# Patient Record
Sex: FEMALE | Race: BLACK OR AFRICAN AMERICAN | NOT HISPANIC OR LATINO | Employment: FULL TIME | URBAN - METROPOLITAN AREA
[De-identification: names, ages, dates, MRNs, and addresses within clinical notes are randomized per-mention and may not be internally consistent; named-entity substitution may affect disease eponyms.]

---

## 2022-05-01 ENCOUNTER — OFFICE VISIT (OUTPATIENT)
Dept: URGENT CARE | Facility: URGENT CARE | Age: 22
End: 2022-05-01
Payer: COMMERCIAL

## 2022-05-01 ENCOUNTER — ANCILLARY PROCEDURE (OUTPATIENT)
Dept: GENERAL RADIOLOGY | Facility: CLINIC | Age: 22
End: 2022-05-01
Attending: PHYSICIAN ASSISTANT
Payer: COMMERCIAL

## 2022-05-01 VITALS
DIASTOLIC BLOOD PRESSURE: 68 MMHG | HEIGHT: 64 IN | OXYGEN SATURATION: 97 % | WEIGHT: 138 LBS | SYSTOLIC BLOOD PRESSURE: 116 MMHG | RESPIRATION RATE: 16 BRPM | TEMPERATURE: 98.6 F | BODY MASS INDEX: 23.56 KG/M2 | HEART RATE: 86 BPM

## 2022-05-01 DIAGNOSIS — J45.909 MODERATE ASTHMA WITHOUT COMPLICATION, UNSPECIFIED WHETHER PERSISTENT: ICD-10-CM

## 2022-05-01 DIAGNOSIS — R06.02 SHORTNESS OF BREATH: Primary | ICD-10-CM

## 2022-05-01 DIAGNOSIS — R06.02 SHORTNESS OF BREATH: ICD-10-CM

## 2022-05-01 LAB
BASOPHILS # BLD AUTO: 0.1 10E3/UL (ref 0–0.2)
BASOPHILS NFR BLD AUTO: 0 %
EOSINOPHIL # BLD AUTO: 0.1 10E3/UL (ref 0–0.7)
EOSINOPHIL NFR BLD AUTO: 0 %
ERYTHROCYTE [DISTWIDTH] IN BLOOD BY AUTOMATED COUNT: 11.7 % (ref 10–15)
HCT VFR BLD AUTO: 39.1 % (ref 35–47)
HGB BLD-MCNC: 12.8 G/DL (ref 11.7–15.7)
IMM GRANULOCYTES # BLD: 0 10E3/UL
IMM GRANULOCYTES NFR BLD: 0 %
LYMPHOCYTES # BLD AUTO: 1 10E3/UL (ref 0.8–5.3)
LYMPHOCYTES NFR BLD AUTO: 8 %
MCH RBC QN AUTO: 32.2 PG (ref 26.5–33)
MCHC RBC AUTO-ENTMCNC: 32.7 G/DL (ref 31.5–36.5)
MCV RBC AUTO: 98 FL (ref 78–100)
MONOCYTES # BLD AUTO: 0.7 10E3/UL (ref 0–1.3)
MONOCYTES NFR BLD AUTO: 5 %
NEUTROPHILS # BLD AUTO: 11.3 10E3/UL (ref 1.6–8.3)
NEUTROPHILS NFR BLD AUTO: 86 %
PLATELET # BLD AUTO: 198 10E3/UL (ref 150–450)
RBC # BLD AUTO: 3.98 10E6/UL (ref 3.8–5.2)
WBC # BLD AUTO: 13.1 10E3/UL (ref 4–11)

## 2022-05-01 PROCEDURE — 85025 COMPLETE CBC W/AUTO DIFF WBC: CPT | Performed by: PHYSICIAN ASSISTANT

## 2022-05-01 PROCEDURE — U0005 INFEC AGEN DETEC AMPLI PROBE: HCPCS | Performed by: PHYSICIAN ASSISTANT

## 2022-05-01 PROCEDURE — 99204 OFFICE O/P NEW MOD 45 MIN: CPT | Performed by: PHYSICIAN ASSISTANT

## 2022-05-01 PROCEDURE — U0003 INFECTIOUS AGENT DETECTION BY NUCLEIC ACID (DNA OR RNA); SEVERE ACUTE RESPIRATORY SYNDROME CORONAVIRUS 2 (SARS-COV-2) (CORONAVIRUS DISEASE [COVID-19]), AMPLIFIED PROBE TECHNIQUE, MAKING USE OF HIGH THROUGHPUT TECHNOLOGIES AS DESCRIBED BY CMS-2020-01-R: HCPCS | Performed by: PHYSICIAN ASSISTANT

## 2022-05-01 PROCEDURE — 71046 X-RAY EXAM CHEST 2 VIEWS: CPT | Mod: TC | Performed by: RADIOLOGY

## 2022-05-01 PROCEDURE — 36415 COLL VENOUS BLD VENIPUNCTURE: CPT | Performed by: PHYSICIAN ASSISTANT

## 2022-05-01 RX ORDER — ALBUTEROL SULFATE 90 UG/1
2 AEROSOL, METERED RESPIRATORY (INHALATION) ONCE
Status: COMPLETED | OUTPATIENT
Start: 2022-05-01 | End: 2022-05-01

## 2022-05-01 RX ORDER — ALBUTEROL SULFATE 0.83 MG/ML
2.5 SOLUTION RESPIRATORY (INHALATION) EVERY 6 HOURS PRN
Qty: 90 ML | Refills: 0 | Status: SHIPPED | OUTPATIENT
Start: 2022-05-01

## 2022-05-01 RX ADMIN — ALBUTEROL SULFATE 2 PUFF: 90 INHALANT RESPIRATORY (INHALATION) at 19:11

## 2022-05-01 NOTE — PROGRESS NOTES
Assessment & Plan     1. Shortness of breath  - albuterol (PROVENTIL HFA/VENTOLIN HFA) inhaler  - XR Chest 2 Views; Future  - Nebulizer and Supplies Order for DME - ONLY FOR DME  - CBC with platelets and differential; Future  - Symptomatic; Unknown COVID-19 Virus (Coronavirus) by PCR Nose; Future  - CBC with platelets and differential  - Symptomatic; Unknown COVID-19 Virus (Coronavirus) by PCR Nose    2. Moderate asthma without complication, unspecified whether persistent  - albuterol (PROVENTIL) (2.5 MG/3ML) 0.083% neb solution; Take 1 vial (2.5 mg) by nebulization every 6 hours as needed for shortness of breath / dyspnea or wheezing  Dispense: 90 mL; Refill: 0    21-year-old female presents the clinic for evaluation of shortness of breath.  Symptoms started earlier this morning and additionally has had cough, body aches and fatigue.  She took an at home COVID test which was negative.  On exam, she appears well.  Vital signs are stable.  She is not hypoxic or tachypneic.  Lungs are clear to auscultation bilaterally, she does not have wheezing, rales or rhonchi.  Throat is clear without evidence of PTA or RPA.  TMs are clear bilaterally.  Chest x-ray is negative for infiltrate.  Due to her fatigue a CBC was obtained, she does have slightly elevated white blood cell count which I suspect is related to her viral illness. Hgb is stable.  Very low suspicion for PE, she is PERC negative and no further testing was done. she was given an albuterol inhaler, which helped with her shortness of breath.  She was also given refill for her nebulizer.  COVID test is pending.  Encourage fluids, rest and humidified air at night.    Return in about 3 days (around 5/4/2022), or if symptoms worsen or fail to improve.    Diagnosis and treatment plan was reviewed with patient and/or family.   We went over any labs or imaging. Discussed worsening symptoms or little to no relief despite treatment plan to follow-up with PCP or return to  "clinic.  Patient verbalizes understanding. All questions were addressed and answered.     Jia Aguirre PA-C  [facility] [place]    CHIEF COMPLAINT:   Chief Complaint   Patient presents with     Urgent Care     Needs work note because she is having shortness of breath and had to leave work.      Breathing Problem     Shortness of breath since this morning, fatigue, coughing and body aches. At home covid neg.      Subjective     Glenys is a 21 year old female who presents to clinic today for evaluation.  Starting this morning patient had cough, chest congestion, body aches and shortness of breath.  Additionally had fatigue.  Needed to leave work early because it was hard to do her work.  She took an at home COVID test which was negative.  She has a history of asthma, but has not needed an inhaler for the past 10 years.  She has not had fever or chills.  No chest pain.  Denies leg pain or swelling.  She has not had recent travel or surgery.      Past Medical History:   Diagnosis Date     Asthma      No past surgical history on file.  Social History     Tobacco Use     Smoking status: Never Smoker     Smokeless tobacco: Never Used   Substance Use Topics     Alcohol use: No     Current Outpatient Medications   Medication     albuterol (PROVENTIL) (2.5 MG/3ML) 0.083% neb solution     fluticasone-salmeterol (ADVAIR DISKUS) 250-50 MCG/DOSE diskus inhaler     No current facility-administered medications for this visit.     Allergies   Allergen Reactions     Amoxicillin Hives and Nausea and Vomiting       10 point ROS of systems were all negative except for pertinent positives noted in my HPI.      Exam:   /68   Pulse 86   Temp 98.6  F (37  C) (Temporal)   Resp 16   Ht 1.626 m (5' 4\")   Wt 62.6 kg (138 lb)   LMP 04/24/2022   SpO2 97%   Breastfeeding No   BMI 23.69 kg/m    Constitutional: healthy, alert and no distress  Head: Normocephalic, atraumatic.  Eyes: conjunctiva clear, " no drainage  ENT: TMs clear and shiny hyun, nasal mucosa pink and moist, throat without tonsillar hypertrophy or erythema  Neck: neck is supple, no cervical lymphadenopathy or nuchal rigidity  Cardiovascular: RRR  Respiratory: CTA bilaterally, no rhonchi or rales  Extremities: Neg homans sign B/L  Skin: no rashes  Neurologic: Speech clear, gait normal. Moves all extremities.    Results for orders placed or performed in visit on 05/01/22   XR Chest 2 Views     Status: None    Narrative    EXAM: XR CHEST 2 VW  LOCATION: [facility]  DATE/TIME: 5/1/2022 6:29 PM    INDICATION: shortness of breath  COMPARISON: None.      Impression    IMPRESSION: Negative chest.   Results for orders placed or performed in visit on 05/01/22   CBC with platelets and differential     Status: Abnormal   Result Value Ref Range    WBC Count 13.1 (H) 4.0 - 11.0 10e3/uL    RBC Count 3.98 3.80 - 5.20 10e6/uL    Hemoglobin 12.8 11.7 - 15.7 g/dL    Hematocrit 39.1 35.0 - 47.0 %    MCV 98 78 - 100 fL    MCH 32.2 26.5 - 33.0 pg    MCHC 32.7 31.5 - 36.5 g/dL    RDW 11.7 10.0 - 15.0 %    Platelet Count 198 150 - 450 10e3/uL    % Neutrophils 86 %    % Lymphocytes 8 %    % Monocytes 5 %    % Eosinophils 0 %    % Basophils 0 %    % Immature Granulocytes 0 %    Absolute Neutrophils 11.3 (H) 1.6 - 8.3 10e3/uL    Absolute Lymphocytes 1.0 0.8 - 5.3 10e3/uL    Absolute Monocytes 0.7 0.0 - 1.3 10e3/uL    Absolute Eosinophils 0.1 0.0 - 0.7 10e3/uL    Absolute Basophils 0.1 0.0 - 0.2 10e3/uL    Absolute Immature Granulocytes 0.0 <=0.4 10e3/uL   CBC with platelets and differential     Status: Abnormal    Narrative    The following orders were created for panel order CBC with platelets and differential.  Procedure                               Abnormality         Status                     ---------                               -----------         ------                     CBC with platelets and d...[[id number]]  Abnormal            Final  result                 Please view results for these tests on the individual orders.

## 2022-05-01 NOTE — LETTER
[facility] [place]  [address] [place] [address]  Phone: [phone]    May 1, 2022        Glenys Levi  [address] [place] [address]          To whom it may concern:    RE: Glenys Levi    Patient was seen and treated today at our clinic. Please excuse patient from work 5/1/2022.     Please contact me for questions or concerns.      Sincerely,        Jia Aguirre PA-C

## 2022-05-03 LAB — SARS-COV-2 RNA RESP QL NAA+PROBE: NEGATIVE

## 2022-11-19 ENCOUNTER — OFFICE VISIT (OUTPATIENT)
Dept: URGENT CARE | Facility: URGENT CARE | Age: 22
End: 2022-11-19
Payer: COMMERCIAL

## 2022-11-19 DIAGNOSIS — Z53.9 DIAGNOSIS NOT YET DEFINED: Primary | ICD-10-CM

## 2024-08-25 ENCOUNTER — HEALTH MAINTENANCE LETTER (OUTPATIENT)
Age: 24
End: 2024-08-25

## 2024-09-17 ENCOUNTER — IMMUNIZATION (OUTPATIENT)
Dept: FAMILY MEDICINE | Facility: CLINIC | Age: 24
End: 2024-09-17
Payer: COMMERCIAL

## 2024-09-17 DIAGNOSIS — Z23 ENCOUNTER FOR IMMUNIZATION: Primary | ICD-10-CM

## 2024-09-17 PROCEDURE — 90656 IIV3 VACC NO PRSV 0.5 ML IM: CPT

## 2024-09-17 PROCEDURE — 90471 IMMUNIZATION ADMIN: CPT

## 2024-09-17 PROCEDURE — 99207 PR NO CHARGE NURSE ONLY: CPT

## 2024-09-17 NOTE — PROGRESS NOTES
Prior to immunization administration, verified patients identity using patient s name and date of birth. Please see Immunization Activity for additional information.     Is the patient's temperature normal (100.5 or less)? Yes     Patient MEETS CRITERIA. PROCEED with vaccine administration.         No data to display                      9/17/2024   COVID   Have you had myocarditis or pericarditis (inflammation of or around the heart muscle) after getting a COVID-19 vaccine? No   Have you had a serious reaction to a COVID vaccine or something in a COVID vaccine, like polyethylene glycol (PEG) or polysorbate? No   Have you had multisystem inflammatory syndrome from COVID-19 in the past 90 days? No            Patient MEETS CRITERIA. PROCEED with vaccine administration.        9/17/2024   INFLUENZA   Would you like to receive the flu shot or the nasal flu vaccine today? Flu Shot   Have you had a serious reaction to a flu vaccine or something in a flu vaccine? No   Have you had Guillain-Marshall syndrome within 6 weeks of getting a vaccine? No   Have you received a bone marrow transplant within the previous 6 months? No            Patient MEETS CRITERIA. PROCEED with vaccine administration.        Patient instructed to remain in clinic for 15 minutes afterwards, and to report any adverse reactions.      Link to Ancillary Visit Immunization Standing Orders SmartSet     Screening performed by Sarahi Gaitan on 9/17/2024 at 1:39 PM.

## 2024-10-08 ENCOUNTER — OFFICE VISIT (OUTPATIENT)
Dept: FAMILY MEDICINE | Facility: CLINIC | Age: 24
End: 2024-10-08
Payer: COMMERCIAL

## 2024-10-08 ENCOUNTER — MYC MEDICAL ADVICE (OUTPATIENT)
Dept: FAMILY MEDICINE | Facility: CLINIC | Age: 24
End: 2024-10-08

## 2024-10-08 VITALS
DIASTOLIC BLOOD PRESSURE: 82 MMHG | BODY MASS INDEX: 21.1 KG/M2 | OXYGEN SATURATION: 98 % | HEART RATE: 69 BPM | SYSTOLIC BLOOD PRESSURE: 116 MMHG | RESPIRATION RATE: 16 BRPM | WEIGHT: 123.6 LBS | HEIGHT: 64 IN | TEMPERATURE: 98.3 F

## 2024-10-08 DIAGNOSIS — F51.04 CHRONIC INSOMNIA: ICD-10-CM

## 2024-10-08 DIAGNOSIS — J45.20 MILD INTERMITTENT ASTHMA WITHOUT COMPLICATION: ICD-10-CM

## 2024-10-08 DIAGNOSIS — Z11.3 SCREEN FOR STD (SEXUALLY TRANSMITTED DISEASE): ICD-10-CM

## 2024-10-08 DIAGNOSIS — L30.9 ECZEMA, UNSPECIFIED TYPE: ICD-10-CM

## 2024-10-08 DIAGNOSIS — Z11.59 NEED FOR HEPATITIS C SCREENING TEST: ICD-10-CM

## 2024-10-08 DIAGNOSIS — Z12.4 CERVICAL CANCER SCREENING: ICD-10-CM

## 2024-10-08 DIAGNOSIS — J30.81 ALLERGIC RHINITIS DUE TO ANIMALS: ICD-10-CM

## 2024-10-08 DIAGNOSIS — Z13.9 ENCOUNTER FOR SCREENING INVOLVING SOCIAL DETERMINANTS OF HEALTH (SDOH): ICD-10-CM

## 2024-10-08 DIAGNOSIS — H53.9 VISION CHANGES: ICD-10-CM

## 2024-10-08 DIAGNOSIS — Z11.4 SCREENING FOR HIV (HUMAN IMMUNODEFICIENCY VIRUS): ICD-10-CM

## 2024-10-08 DIAGNOSIS — Z00.00 ROUTINE GENERAL MEDICAL EXAMINATION AT A HEALTH CARE FACILITY: Primary | ICD-10-CM

## 2024-10-08 PROBLEM — F90.2 ATTENTION DEFICIT HYPERACTIVITY DISORDER (ADHD), COMBINED TYPE: Status: ACTIVE | Noted: 2022-07-20

## 2024-10-08 LAB
HCV AB SERPL QL IA: NONREACTIVE
HIV 1+2 AB+HIV1 P24 AG SERPL QL IA: NONREACTIVE
T PALLIDUM AB SER QL: NONREACTIVE

## 2024-10-08 PROCEDURE — 36415 COLL VENOUS BLD VENIPUNCTURE: CPT | Performed by: PHYSICIAN ASSISTANT

## 2024-10-08 PROCEDURE — 86803 HEPATITIS C AB TEST: CPT | Performed by: PHYSICIAN ASSISTANT

## 2024-10-08 PROCEDURE — G0145 SCR C/V CYTO,THINLAYER,RESCR: HCPCS | Performed by: PHYSICIAN ASSISTANT

## 2024-10-08 PROCEDURE — 99395 PREV VISIT EST AGE 18-39: CPT | Performed by: PHYSICIAN ASSISTANT

## 2024-10-08 PROCEDURE — 87389 HIV-1 AG W/HIV-1&-2 AB AG IA: CPT | Performed by: PHYSICIAN ASSISTANT

## 2024-10-08 PROCEDURE — 87491 CHLMYD TRACH DNA AMP PROBE: CPT | Performed by: PHYSICIAN ASSISTANT

## 2024-10-08 PROCEDURE — 86780 TREPONEMA PALLIDUM: CPT | Performed by: PHYSICIAN ASSISTANT

## 2024-10-08 PROCEDURE — 87591 N.GONORRHOEAE DNA AMP PROB: CPT | Performed by: PHYSICIAN ASSISTANT

## 2024-10-08 PROCEDURE — 99214 OFFICE O/P EST MOD 30 MIN: CPT | Mod: 25 | Performed by: PHYSICIAN ASSISTANT

## 2024-10-08 RX ORDER — TRIAMCINOLONE ACETONIDE 1 MG/G
CREAM TOPICAL 2 TIMES DAILY
COMMUNITY

## 2024-10-08 RX ORDER — GABAPENTIN 300 MG/1
300 CAPSULE ORAL AT BEDTIME
COMMUNITY
Start: 2024-08-20

## 2024-10-08 RX ORDER — DEXTROAMPHETAMINE SACCHARATE, AMPHETAMINE ASPARTATE MONOHYDRATE, DEXTROAMPHETAMINE SULFATE AND AMPHETAMINE SULFATE 7.5; 7.5; 7.5; 7.5 MG/1; MG/1; MG/1; MG/1
30 CAPSULE, EXTENDED RELEASE ORAL EVERY MORNING
COMMUNITY

## 2024-10-08 RX ORDER — HYDROXYZINE HYDROCHLORIDE 25 MG/1
25 TABLET, FILM COATED ORAL 3 TIMES DAILY PRN
COMMUNITY

## 2024-10-08 RX ORDER — CETIRIZINE HYDROCHLORIDE 10 MG/1
10 TABLET ORAL DAILY PRN
Qty: 90 TABLET | Refills: 1 | Status: SHIPPED | OUTPATIENT
Start: 2024-10-08

## 2024-10-08 RX ORDER — LEVALBUTEROL TARTRATE 45 UG/1
2 AEROSOL, METERED ORAL EVERY 4 HOURS PRN
Qty: 15 G | Refills: 3 | Status: SHIPPED | OUTPATIENT
Start: 2024-10-08

## 2024-10-08 RX ORDER — TRIAMCINOLONE ACETONIDE 1 MG/G
CREAM TOPICAL 2 TIMES DAILY
Qty: 45 G | Refills: 1 | Status: SHIPPED | OUTPATIENT
Start: 2024-10-08

## 2024-10-08 SDOH — HEALTH STABILITY: PHYSICAL HEALTH: ON AVERAGE, HOW MANY DAYS PER WEEK DO YOU ENGAGE IN MODERATE TO STRENUOUS EXERCISE (LIKE A BRISK WALK)?: 0 DAYS

## 2024-10-08 ASSESSMENT — ASTHMA QUESTIONNAIRES
QUESTION_5 LAST FOUR WEEKS HOW WOULD YOU RATE YOUR ASTHMA CONTROL: NOT CONTROLLED AT ALL
QUESTION_2 LAST FOUR WEEKS HOW OFTEN HAVE YOU HAD SHORTNESS OF BREATH: THREE TO SIX TIMES A WEEK
QUESTION_4 LAST FOUR WEEKS HOW OFTEN HAVE YOU USED YOUR RESCUE INHALER OR NEBULIZER MEDICATION (SUCH AS ALBUTEROL): TWO OR THREE TIMES PER WEEK
QUESTION_1 LAST FOUR WEEKS HOW MUCH OF THE TIME DID YOUR ASTHMA KEEP YOU FROM GETTING AS MUCH DONE AT WORK, SCHOOL OR AT HOME: SOME OF THE TIME
ACT_TOTALSCORE: 12
ACT_TOTALSCORE: 12
QUESTION_3 LAST FOUR WEEKS HOW OFTEN DID YOUR ASTHMA SYMPTOMS (WHEEZING, COUGHING, SHORTNESS OF BREATH, CHEST TIGHTNESS OR PAIN) WAKE YOU UP AT NIGHT OR EARLIER THAN USUAL IN THE MORNING: TWO OR THREE NIGHTS A WEEK

## 2024-10-08 ASSESSMENT — PAIN SCALES - GENERAL: PAINLEVEL: NO PAIN (0)

## 2024-10-08 ASSESSMENT — SOCIAL DETERMINANTS OF HEALTH (SDOH): HOW OFTEN DO YOU GET TOGETHER WITH FRIENDS OR RELATIVES?: TWICE A WEEK

## 2024-10-08 NOTE — TELEPHONE ENCOUNTER
Please advise if appropriate, patient seen today - thank you!      Ary Zapata, RN, BSN  [facility]

## 2024-10-08 NOTE — PROGRESS NOTES
Preventive Care Visit  [facility]  Elena Nixon PA-C, Physician Assistant - Medical  Oct 8, 2024      Assessment & Plan     Routine general medical examination at a health care facility      Encounter for screening involving social determinants of health (SDoH)  Does note that she has  but open to care coordination.  - Primary Care - Care Coordination Referral; Future    Vision changes  Will pursue comprehensive eye exam.  - Adult Eye  Referral; Future    Mild intermittent asthma without complication  More triggered by allergies, no current inhaler, has had palpitations with Adderall in the past prescribed leave albuterol, this was sent to pharmacy.  - levalbuterol (XOPENEX HFA) 45 MCG/ACT inhaler; Inhale 2 puffs into the lungs every 4 hours as needed for shortness of breath or wheezing.    Allergic rhinitis due to animals  Difficulty with cost of antihistamines, prescribed cetirizine.  She had previously been using hydroxyzine with inadequate control, she is prescribed this for anxiety symptoms.  - cetirizine (ZYRTEC) 10 MG tablet; Take 1 tablet (10 mg) by mouth daily as needed for allergies.    Eczema, unspecified type  Refill given.  Discussed twice a day.  Discussed skin care.  - triamcinolone (KENALOG) 0.1 % external cream; Apply topically 2 times daily.    Cervical cancer screening    - Pap Screen Reflex to HPV if ASCUS - Recommended Age 25 - 29 Years    Screening for HIV (human immunodeficiency virus)    - HIV Antigen Antibody Combo; Future  - HIV Antigen Antibody Combo    Need for hepatitis C screening test    - Hepatitis C Screen Reflex to HCV RNA Quant and Genotype; Future  - Hepatitis C Screen Reflex to HCV RNA Quant and Genotype    Screen for STD (sexually transmitted disease)    - Treponema Abs w Reflex to RPR and Titer; Future  - Chlamydia trachomatis/Neisseria gonorrhoeae by PCR - Clinic Collect  - Treponema Abs w Reflex to RPR and  Titer            Counseling  Appropriate preventive services were addressed with this patient via screening, questionnaire, or discussion as appropriate for fall prevention, nutrition, physical activity, Tobacco-use cessation, social engagement, weight loss and cognition.  Checklist reviewing preventive services available has been given to the patient.  Reviewed patient's diet, addressing concerns and/or questions.   The patient was instructed to see the dentist every 6 months.           Eleazar Veronica is a 24 year old, presenting for the following:  Physical        10/8/2024     9:30 AM   Additional Questions   Roomed by Yael   Accompanied by self        Health Care Directive  Patient does not have a Health Care Directive or Living Will: Discussed advance care planning with patient; information given to patient to review.    HPI    History of asthma, does get periodic shortness of breath.  Flares would be cats and uncertain about others.  Does not currently have an inhaler.  Per chart review looks like she has been prescribed needed albuterol, she reports that she had a lot of palpitations with albuterol.    Gets periodic allergies more to cats.  This does flare her breathing.  She has hydroxyzine prescribed by her psychiatry team but she will use it for allergies as well, not overly effective.  Cost is a concern with over-the-counter antihistamines.    She does have eczema, only current active spot to his right knee.  Has triamcinolone, would like refill.      Interested in STI screening, no known exposure.  Not currently on birth control, has been on different types in the past.  Did not like Depo.  Unsure if she wants to restart this.    Follows with psychiatry for ADHD and sleep.  Only on Adderall and gabapentin at bedtime.  Going well.  Did miss a recent appointment and needs to reschedule.                  10/8/2024   General Health   How would you rate your overall physical health? (!) FAIR   Feel  stress (tense, anxious, or unable to sleep) Very much      (!) STRESS CONCERN      10/8/2024   Nutrition   Three or more servings of calcium each day? (!) NO   Diet: Breakfast skipped   How many servings of fruit and vegetables per day? (!) 0-1   How many sweetened beverages each day? 0-1            10/8/2024   Exercise   Days per week of moderate/strenous exercise 0 days      (!) EXERCISE CONCERN      10/8/2024   Social Factors   Frequency of gathering with friends or relatives Twice a week   Worry food won't last until get money to buy more Yes   Food not last or not have enough money for food? Yes   Do you have housing? (Housing is defined as stable permanent housing and does not include staying ouside in a car, in a tent, in an abandoned building, in an overnight shelter, or couch-surfing.) Yes   Are you worried about losing your housing? Yes   Lack of transportation? Yes   Unable to get utilities (heat,electricity)? Yes   Want help with housing or utility concern? (!) YES      (!) FOOD SECURITY CONCERN PRESENT (!) TRANSPORTATION CONCERN PRESENT(!) HOUSING CONCERN PRESENT(!) FINANCIAL RESOURCE STRAIN CONCERN      10/8/2024   Dental   Dentist two times every year? (!) NO            10/8/2024   TB Screening   Were you born outside of the [place]? No            Today's PHQ-2 Score:       10/8/2024     9:24 AM   PHQ-2 ( 1999 [employer])   Q1: Little interest or pleasure in doing things 1   Q2: Feeling down, depressed or hopeless 1   PHQ-2 Score 2   Q1: Little interest or pleasure in doing things Several days   Q2: Feeling down, depressed or hopeless Several days   PHQ-2 Score 2           10/8/2024   Substance Use   Alcohol more than 3/day or more than 7/wk No   Do you use any other substances recreationally? No        Social History     Tobacco Use    Smoking status: Never    Smokeless tobacco: Never   Substance Use Topics    Alcohol use: No    Drug use: No          Mammogram Screening - Patient under 40 years of age:  "Routine Mammogram Screening not recommended.           10/8/2024   One time HIV Screening   Previous HIV test? Yes          10/8/2024   STI Screening   New sexual partner(s) since last STI/HIV test? No        History of abnormal Pap smear: No - age 21-29 PAP every 3 years recommended             10/8/2024   Contraception/Family Planning   Questions about contraception or family planning (!) YES            Reviewed and updated as needed this visit by Provider   Tobacco     Med Hx  Surg Hx  Fam Hx                     Objective    Exam  /82 (BP Location: Left arm, Patient Position: Sitting, Cuff Size: Child)   Pulse 69   Temp 98.3  F (36.8  C) (Temporal)   Resp 16   Ht 1.625 m (5' 3.98\")   Wt 56.1 kg (123 lb 9.6 oz)   LMP 09/27/2024 (Exact Date)   SpO2 98%   BMI 21.23 kg/m     Estimated body mass index is 21.23 kg/m  as calculated from the following:    Height as of this encounter: 1.625 m (5' 3.98\").    Weight as of this encounter: 56.1 kg (123 lb 9.6 oz).    Physical Exam  GENERAL: alert and no distress  EYES: Eyes grossly normal to inspection, PERRL and conjunctivae and sclerae normal  HENT: ear canals and TM's normal, nose and mouth without ulcers or lesions  NECK: no adenopathy, no asymmetry, masses, or scars  RESP: lungs clear to auscultation - no rales, rhonchi or wheezes  CV: regular rate and rhythm, normal S1 S2, no S3 or S4, no murmur, click or rub, no peripheral edema  ABDOMEN: soft, nontender, no hepatosplenomegaly, no masses and bowel sounds normal   (female): normal female external genitalia, normal urethral meatus , normal vaginal mucosa, and normal cervix  MS: no gross musculoskeletal defects noted, no edema  SKIN: no suspicious lesions or rashes  NEURO: Normal strength and tone, mentation intact and speech normal  PSYCH: mentation appears normal, affect normal/bright        Signed Electronically by: Elena Nixon PA-C    "

## 2024-10-08 NOTE — PATIENT INSTRUCTIONS
At [facility], we strive to deliver an exceptional experience to you, every time we see you. If you receive a survey, please let us know what we are doing well and/or what we could improve upon, as we do value your feedback.  If you have MyChart, you can expect to receive results automatically within 24 hours of their completion.  Your provider will send a note interpreting your results as well.   If you do not have MyChart, you should receive your results in about a week by mail.    Your care team:                            Family Medicine Internal Medicine   MD Jayesh Zamora, MD Christi Santamaria, MD Flaco Castillo, MD Susan Anderson, PABakariC    Omid Jj, MD Pediatrics   Kirsten Wheeler, MD Marlene Cortés, MD Randi Singh, APRN CNP Jenny Chavez APRN CNP   Homer Jade, MD Berta Chanel, MD My Gooden, CNP     Jozef Chance, CNP Same-Day Provider (No follow-up visits)   BRITTANY Sprague, DNP Nohemi Santana, PA-C   BRITTANY Canales, FNP, BC HALLIE ArellanoC     Clinic hours: Monday - Thursday 7 am-6 pm; Fridays 7 am-5 pm.   Urgent care: Monday - Friday 10 am- 8 pm; Saturday and Sunday 9 am-5 pm.    Clinic: [phone]       [facility]: Monday - Thursday 8 am - 7 pm; Friday 8 am - 6 pm  [facility]: [phone]    Patient Education   Preventive Care Advice   This is general advice given by our system to help you stay healthy. However, your care team may have specific advice just for you. Please talk to your care team about your preventive care needs.  Nutrition  Eat 5 or more servings of fruits and vegetables each day.  Try wheat bread, brown rice and whole grain pasta (instead of white bread, rice, and pasta).  Get enough calcium and vitamin D. Check the label on foods and aim for 100% of the RDA (recommended daily allowance).  Lifestyle  Exercise at least 150  minutes each week  (30 minutes a day, 5 days a week).  Do muscle strengthening activities 2 days a week. These help control your weight and prevent disease.  No smoking.  Wear sunscreen to prevent skin cancer.  Have a dental exam and cleaning every 6 months.  Yearly exams  See your health care team every year to talk about:  Any changes in your health.  Any medicines your care team has prescribed.  Preventive care, family planning, and ways to prevent chronic diseases.  Shots (vaccines)   HPV shots (up to age 26), if you've never had them before.  Hepatitis B shots (up to age 59), if you've never had them before.  COVID-19 shot: Get this shot when it's due.  Flu shot: Get a flu shot every year.  Tetanus shot: Get a tetanus shot every 10 years.  Pneumococcal, hepatitis A, and RSV shots: Ask your care team if you need these based on your risk.  Shingles shot (for age 50 and up)  General health tests  Diabetes screening:  Starting at age 35, Get screened for diabetes at least every 3 years.  If you are younger than age 35, ask your care team if you should be screened for diabetes.  Cholesterol test: At age 39, start having a cholesterol test every 5 years, or more often if advised.  Bone density scan (DEXA): At age 50, ask your care team if you should have this scan for osteoporosis (brittle bones).  Hepatitis C: Get tested at least once in your life.  STIs (sexually transmitted infections)  Before age 24: Ask your care team if you should be screened for STIs.  After age 24: Get screened for STIs if you're at risk. You are at risk for STIs (including HIV) if:  You are sexually active with more than one person.  You don't use condoms every time.  You or a partner was diagnosed with a sexually transmitted infection.  If you are at risk for HIV, ask about PrEP medicine to prevent HIV.  Get tested for HIV at least once in your life, whether you are at risk for HIV or not.  Cancer screening tests  Cervical cancer screening:  If you have a cervix, begin getting regular cervical cancer screening tests starting at age 21.  Breast cancer scan (mammogram): If you've ever had breasts, begin having regular mammograms starting at age 40. This is a scan to check for breast cancer.  Colon cancer screening: It is important to start screening for colon cancer at age 45.  Have a colonoscopy test every 10 years (or more often if you're at risk) Or, ask your provider about stool tests like a FIT test every year or Cologuard test every 3 years.  To learn more about your testing options, visit:   .  For help making a decision, visit:   https://bit.ly/ui55363.  Prostate cancer screening test: If you have a prostate, ask your care team if a prostate cancer screening test (PSA) at age 55 is right for you.  Lung cancer screening: If you are a current or former smoker ages 50 to 80, ask your care team if ongoing lung cancer screenings are right for you.  For informational purposes only. Not to replace the advice of your health care provider. Copyright   2023 [facility] [employer]. All rights reserved. Clinically reviewed by the [facility] Transitions Program. [employer] [id number] - REV 01/24.  Learning About Stress  What is stress?     Stress is your body's response to a hard situation. Your body can have a physical, emotional, or mental response. Stress is a fact of life for most people, and it affects everyone differently. What causes stress for you may not be stressful for someone else.  A lot of things can cause stress. You may feel stress when you go on a job interview, take a test, or run a race. This kind of short-term stress is normal and even useful. It can help you if you need to work hard or react quickly. For example, stress can help you finish an important job on time.  Long-term stress is caused by ongoing stressful situations or events. Examples of long-term stress include long-term health problems, ongoing problems at work, or  conflicts in your family. Long-term stress can harm your health.  How does stress affect your health?  When you are stressed, your body responds as though you are in danger. It makes hormones that speed up your heart, make you breathe faster, and give you a burst of energy. This is called the fight-or-flight stress response. If the stress is over quickly, your body goes back to normal and no harm is done.  But if stress happens too often or lasts too long, it can have bad effects. Long-term stress can make you more likely to get sick, and it can make symptoms of some diseases worse. If you tense up when you are stressed, you may develop neck, shoulder, or low back pain. Stress is linked to high blood pressure and heart disease.  Stress also harms your emotional health. It can make you florian, tense, or depressed. Your relationships may suffer, and you may not do well at work or school.  What can you do to manage stress?  You can try these things to help manage stress:   Do something active. Exercise or activity can help reduce stress. Walking is a great way to get started. Even everyday activities such as housecleaning or yard work can help.  Try yoga or yas chi. These techniques combine exercise and meditation. You may need some training at first to learn them.  Do something you enjoy. For example, listen to music or go to a movie. Practice your hobby or do volunteer work.  Meditate. This can help you relax, because you are not worrying about what happened before or what may happen in the future.  Do guided imagery. Imagine yourself in any setting that helps you feel calm. You can use online videos, books, or a teacher to guide you.  Do breathing exercises. For example:  From a standing position, bend forward from the waist with your knees slightly bent. Let your arms dangle close to the floor.  Breathe in slowly and deeply as you return to a standing position. Roll up slowly and lift your head last.  Hold your  "breath for just a few seconds in the standing position.  Breathe out slowly and bend forward from the waist.  Let your feelings out. Talk, laugh, cry, and express anger when you need to. Talking with supportive friends or family, a counselor, or a aster leader about your feelings is a healthy way to relieve stress. Avoid discussing your feelings with people who make you feel worse.  Write. It may help to write about things that are bothering you. This helps you find out how much stress you feel and what is causing it. When you know this, you can find better ways to cope.  What can you do to prevent stress?  You might try some of these things to help prevent stress:  Manage your time. This helps you find time to do the things you want and need to do.  Get enough sleep. Your body recovers from the stresses of the day while you are sleeping.  Get support. Your family, friends, and community can make a difference in how you experience stress.  Limit your news feed. Avoid or limit time on social media or news that may make you feel stressed.  Do something active. Exercise or activity can help reduce stress. Walking is a great way to get started.  Where can you learn more?  Go to https://www.RxRevu.net/patiented  Enter N032 in the search box to learn more about \"Learning About Stress.\"  Current as of: October 24, 2023  Content Version: 14.2 2024 [employer].   Care instructions adapted under license by your healthcare professional. If you have questions about a medical condition or this instruction, always ask your healthcare professional. [employer] disclaims any warranty or liability for your use of this information.       "

## 2024-10-09 ENCOUNTER — PATIENT OUTREACH (OUTPATIENT)
Dept: CARE COORDINATION | Facility: CLINIC | Age: 24
End: 2024-10-09
Payer: COMMERCIAL

## 2024-10-09 DIAGNOSIS — Z71.89 OTHER SPECIFIED COUNSELING: Primary | Chronic | ICD-10-CM

## 2024-10-09 LAB
C TRACH DNA SPEC QL PROBE+SIG AMP: NEGATIVE
N GONORRHOEA DNA SPEC QL NAA+PROBE: NEGATIVE

## 2024-10-09 NOTE — PROGRESS NOTES
Clinic Care Coordination Contact  Community Health Worker Initial Outreach    CHW Initial Information Gathering:  Referral Source: PCP  Preferred Hospital: [facility]  [phone]  Current living arrangement:: I live alone  Type of residence:: Apartment  Community Resources: [facility] Worker  Supplies Currently Used at Home: None  Equipment Currently Used at Home:  (Ankle foot orthodontics)  Informal Support system:: Friends  No PCP office visit in Past Year: No  Transportation means:: Public transportation       Patient accepts CC: Yes. Patient scheduled for assessment with KIMI on 10/15 at 1:00. Patient noted desire to discuss Food insecurities. Patient stated that they would like to apply for SNAP benefits again. The writer made a FRW to help assist with this request. CHW provided food resources: [employer] and [employer]  via [employer]. Patient did state they have a ARMHS and  through the [facility]. The patient would like to apply for disability however the patient [facility] worker may be in process of doing so? .     The writer sent CCC questionnaire via [employer] and requested the patient fill it out prior to 10/15 appt with KIMI.    ALFREDO Berger   [phone]  Clinic Care Coordination  [facility]

## 2024-10-09 NOTE — Clinical Note
Jonn Garcia,  I scheduled an appt on 10/15 @ 1:00. I made a FRW referral to assist with applying for food stamps and sent additional food resources.  [place]

## 2024-10-11 ENCOUNTER — PATIENT OUTREACH (OUTPATIENT)
Dept: CARE COORDINATION | Facility: CLINIC | Age: 24
End: 2024-10-11
Payer: COMMERCIAL

## 2024-10-11 LAB
BKR LAB AP GYN ADEQUACY: NORMAL
BKR LAB AP GYN INTERPRETATION: NORMAL
BKR LAB AP HPV REFLEX: NORMAL
BKR LAB AP PREVIOUS ABNORMAL: NORMAL
PATH REPORT.COMMENTS IMP SPEC: NORMAL
PATH REPORT.COMMENTS IMP SPEC: NORMAL
PATH REPORT.RELEVANT HX SPEC: NORMAL

## 2024-10-15 ENCOUNTER — APPOINTMENT (OUTPATIENT)
Dept: NURSING | Facility: CLINIC | Age: 24
End: 2024-10-15
Payer: COMMERCIAL

## 2024-10-15 ENCOUNTER — PATIENT OUTREACH (OUTPATIENT)
Dept: CARE COORDINATION | Facility: CLINIC | Age: 24
End: 2024-10-15

## 2024-10-15 NOTE — PROGRESS NOTES
Clinic Care Coordination Contact  [facility]/Voicemail    Clinical Data: Care Coordinator Outreach    Outreach Documentation Number of Outreach Attempt   10/15/2024   1:03 PM 1       Left message on patient's voicemail with call back information and requested return call.    Plan:  Care Coordinator will try to reach patient again in 3-5 business days.    TANYA Lizarraga  Social Work Primary Care Clinic Care Coordinator  [facility]  [phone]  [email or web address]

## 2024-10-17 ENCOUNTER — PATIENT OUTREACH (OUTPATIENT)
Dept: CARE COORDINATION | Facility: CLINIC | Age: 24
End: 2024-10-17
Payer: COMMERCIAL

## 2024-11-21 ENCOUNTER — TRANSCRIBE ORDERS (OUTPATIENT)
Dept: OTHER | Age: 24
End: 2024-11-21

## 2024-11-21 DIAGNOSIS — G47.33 OSA (OBSTRUCTIVE SLEEP APNEA): Primary | ICD-10-CM

## 2025-04-03 ASSESSMENT — SLEEP AND FATIGUE QUESTIONNAIRES
HOW LIKELY ARE YOU TO NOD OFF OR FALL ASLEEP WHILE SITTING AND READING: WOULD NEVER DOZE
HOW LIKELY ARE YOU TO NOD OFF OR FALL ASLEEP WHILE SITTING QUIETLY AFTER LUNCH WITHOUT ALCOHOL: WOULD NEVER DOZE
HOW LIKELY ARE YOU TO NOD OFF OR FALL ASLEEP IN A CAR, WHILE STOPPED FOR A FEW MINUTES IN TRAFFIC: SLIGHT CHANCE OF DOZING
HOW LIKELY ARE YOU TO NOD OFF OR FALL ASLEEP WHEN YOU ARE A PASSENGER IN A CAR FOR AN HOUR WITHOUT A BREAK: WOULD NEVER DOZE
HOW LIKELY ARE YOU TO NOD OFF OR FALL ASLEEP WHILE WATCHING TV: WOULD NEVER DOZE
HOW LIKELY ARE YOU TO NOD OFF OR FALL ASLEEP WHILE LYING DOWN TO REST IN THE AFTERNOON WHEN CIRCUMSTANCES PERMIT: WOULD NEVER DOZE
HOW LIKELY ARE YOU TO NOD OFF OR FALL ASLEEP WHILE SITTING INACTIVE IN A PUBLIC PLACE: HIGH CHANCE OF DOZING
HOW LIKELY ARE YOU TO NOD OFF OR FALL ASLEEP WHILE SITTING AND TALKING TO SOMEONE: WOULD NEVER DOZE

## 2025-04-08 ENCOUNTER — OFFICE VISIT (OUTPATIENT)
Dept: SLEEP MEDICINE | Facility: CLINIC | Age: 25
End: 2025-04-08
Payer: COMMERCIAL

## 2025-04-08 VITALS
OXYGEN SATURATION: 100 % | BODY MASS INDEX: 23.56 KG/M2 | HEART RATE: 103 BPM | WEIGHT: 138 LBS | SYSTOLIC BLOOD PRESSURE: 135 MMHG | HEIGHT: 64 IN | DIASTOLIC BLOOD PRESSURE: 80 MMHG

## 2025-04-08 DIAGNOSIS — F51.04 CHRONIC INSOMNIA: ICD-10-CM

## 2025-04-08 DIAGNOSIS — G47.21 CIRCADIAN RHYTHM SLEEP DISORDER, DELAYED SLEEP PHASE TYPE: Primary | ICD-10-CM

## 2025-04-08 PROCEDURE — 99204 OFFICE O/P NEW MOD 45 MIN: CPT | Performed by: INTERNAL MEDICINE

## 2025-04-08 PROCEDURE — 3075F SYST BP GE 130 - 139MM HG: CPT | Performed by: INTERNAL MEDICINE

## 2025-04-08 PROCEDURE — 3079F DIAST BP 80-89 MM HG: CPT | Performed by: INTERNAL MEDICINE

## 2025-04-08 PROCEDURE — 1126F AMNT PAIN NOTED NONE PRSNT: CPT | Performed by: INTERNAL MEDICINE

## 2025-04-08 RX ORDER — MIRTAZAPINE 15 MG/1
TABLET, FILM COATED ORAL
COMMUNITY
Start: 2025-03-13

## 2025-04-08 NOTE — NURSING NOTE
"Chief Complaint   Patient presents with    Sleep Problem     Referred from Dr. Lopez, Excessive Insomnia       Initial /80   Pulse 103   Ht 1.626 m (5' 4\")   Wt 62.6 kg (138 lb)   SpO2 100%   BMI 23.69 kg/m   Estimated body mass index is 23.69 kg/m  as calculated from the following:    Height as of this encounter: 1.626 m (5' 4\").    Weight as of this encounter: 62.6 kg (138 lb).    Medication Reconciliation: complete  ESS 4  Neck circumference: 35 centimeters.  iJa Head MA   "

## 2025-04-08 NOTE — PATIENT INSTRUCTIONS
Instructions for treating Delayed Sleep Phase Syndrome:    Delayed Sleep Phase Syndrome (DSPS) means that your body's internal timing is set late compared to the 24 hour day. Therefore, it is often difficult to get up on time for work in the morning and sometimes difficult to fall asleep on time, in order to get enough sleep. People with DSPS often tend to like to stay up late on weekends and sleep in until between 10 AM and noon, sometimes even later.  This is actually a habit that will perpetuate the problem. It reinforces your body's tendency to be on that later schedule. Keeping the same wake time on all days (or as close as possible) will help your body maintain any advancement you make to your circadian rhythms.    You should go to bed when you are sleepy and ready to sleep. During this entire process, you should not engage in activities that may make it worse, such as watching TV in bed, leaving the TV on all night, drinking any caffeine 6 hours before bed or exercising 1-2 hours before bed.     Start taking Melatonin, 1 mg tablet 5 hours before the time that you fall asleep on average (not your desired bedtime or time that you get in bed, but the time you normally fall asleep on your own).     Upon awakening, get exposure to sun-light for about 30-45 minutes. You do not need to look at the sun, in fact, this is dangerous. Reading the paper with the sun shining on you is adequate.  Alternatively, you may use a Seasonal Affective Disorder Lamp (intensity 10,000 Lux) instead of the sun. The lamp should be positioned 1-2 arms lengths away from you. They lamps are sold at Home Medical Companies such as [employer] or [employer]. A prescription can be written to get insurance coverage in some cases. They are also sold on [employer]. Consider the VEASYT Happy Light Lucent.    Using the light and melatonin should help march your internal clock (known as your circadian rhythms) gradually earlier. As  your bedtime advances, remember to take your melatonin earlier, keeping it 5 hours before your fall asleep time.    Avoid naps and sleeping in because sleeping during the day will delay your body's clock and you will have to start from scratch.     More information about light therapy:    If you have any concerns regarding the safety of bright light therapy for you, it is recommended that you consult an ophthalmologist before using a light box.  If you have a condition that makes your eyes very sensitive to light, macular degeneration, a family history of such problems, or diabetic changes to your eyes, consult an ophthalmologist before using a light box. If you have anxiety disorder and have an increase in anxiety discontinue use.
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
## 2025-05-09 ENCOUNTER — HOSPITAL ENCOUNTER (EMERGENCY)
Facility: CLINIC | Age: 25
Discharge: HOME | End: 2025-05-09
Attending: EMERGENCY MEDICINE | Admitting: EMERGENCY MEDICINE
Payer: COMMERCIAL

## 2025-05-09 VITALS
WEIGHT: 144 LBS | BODY MASS INDEX: 24.59 KG/M2 | RESPIRATION RATE: 18 BRPM | DIASTOLIC BLOOD PRESSURE: 85 MMHG | OXYGEN SATURATION: 100 % | TEMPERATURE: 97.9 F | HEIGHT: 64 IN | SYSTOLIC BLOOD PRESSURE: 127 MMHG | HEART RATE: 87 BPM

## 2025-05-09 DIAGNOSIS — N30.01 ACUTE CYSTITIS WITH HEMATURIA: ICD-10-CM

## 2025-05-09 LAB
ALBUMIN UR-MCNC: 20 MG/DL
APPEARANCE UR: ABNORMAL
BILIRUB UR QL STRIP: ABNORMAL
COLOR UR AUTO: ABNORMAL
GLUCOSE UR STRIP-MCNC: NEGATIVE MG/DL
HCG UR QL: NEGATIVE
HGB UR QL STRIP: ABNORMAL
KETONES UR STRIP-MCNC: NEGATIVE MG/DL
LEUKOCYTE ESTERASE UR QL STRIP: ABNORMAL
MUCOUS THREADS #/AREA URNS LPF: PRESENT /LPF
NITRATE UR QL: POSITIVE
PH UR STRIP: 7 [PH] (ref 5–7)
RBC URINE: 176 /HPF
SP GR UR STRIP: 1.02 (ref 1–1.03)
SQUAMOUS EPITHELIAL: 1 /HPF
TRANSITIONAL EPI: 1 /HPF
UROBILINOGEN UR STRIP-MCNC: 3 MG/DL
WBC CLUMPS #/AREA URNS HPF: PRESENT /HPF
WBC URINE: 101 /HPF

## 2025-05-09 PROCEDURE — 250N000011 HC RX IP 250 OP 636: Performed by: EMERGENCY MEDICINE

## 2025-05-09 PROCEDURE — 99284 EMERGENCY DEPT VISIT MOD MDM: CPT | Performed by: EMERGENCY MEDICINE

## 2025-05-09 PROCEDURE — 81001 URINALYSIS AUTO W/SCOPE: CPT | Performed by: EMERGENCY MEDICINE

## 2025-05-09 PROCEDURE — 250N000013 HC RX MED GY IP 250 OP 250 PS 637: Performed by: EMERGENCY MEDICINE

## 2025-05-09 PROCEDURE — 87186 SC STD MICRODIL/AGAR DIL: CPT | Performed by: EMERGENCY MEDICINE

## 2025-05-09 PROCEDURE — 81025 URINE PREGNANCY TEST: CPT | Performed by: EMERGENCY MEDICINE

## 2025-05-09 RX ORDER — ONDANSETRON 4 MG/1
4 TABLET, ORALLY DISINTEGRATING ORAL ONCE
Status: COMPLETED | OUTPATIENT
Start: 2025-05-09 | End: 2025-05-09

## 2025-05-09 RX ORDER — ONDANSETRON 4 MG/1
4 TABLET, ORALLY DISINTEGRATING ORAL EVERY 8 HOURS PRN
Qty: 10 TABLET | Refills: 0 | Status: SHIPPED | OUTPATIENT
Start: 2025-05-09 | End: 2025-05-12

## 2025-05-09 RX ORDER — ACETAMINOPHEN 500 MG
1000 TABLET ORAL ONCE
Status: COMPLETED | OUTPATIENT
Start: 2025-05-09 | End: 2025-05-09

## 2025-05-09 RX ORDER — NITROFURANTOIN 25; 75 MG/1; MG/1
100 CAPSULE ORAL 2 TIMES DAILY
Qty: 14 CAPSULE | Refills: 0 | Status: SHIPPED | OUTPATIENT
Start: 2025-05-09

## 2025-05-09 RX ADMIN — ACETAMINOPHEN 1000 MG: 500 TABLET ORAL at 11:12

## 2025-05-09 RX ADMIN — ONDANSETRON 4 MG: 4 TABLET, ORALLY DISINTEGRATING ORAL at 11:12

## 2025-05-09 ASSESSMENT — ACTIVITIES OF DAILY LIVING (ADL): ADLS_ACUITY_SCORE: 41

## 2025-05-09 NOTE — ED TRIAGE NOTES
Patient ambulatory to triage with c/o blood in the urine and painful urination. Patient stated this started about 3AM. Patient reported burning sensation, urinary incontinence and swollen vulva. Patient stated she had urinary frequency last Sunday and has resolved. Patient also reported feeling nauseous since Monday. Denied fevers.      Triage Assessment (Adult)       Row Name 05/09/25 1012          Triage Assessment    Airway WDL WDL        Respiratory WDL    Respiratory WDL WDL        Skin Circulation/Temperature WDL    Skin Circulation/Temperature WDL WDL        Cardiac WDL    Cardiac WDL WDL        Peripheral/Neurovascular WDL    Peripheral Neurovascular WDL WDL        Cognitive/Neuro/Behavioral WDL    Cognitive/Neuro/Behavioral WDL WDL        [place] Coma Scale    Best Eye Response 4-->(E4) spontaneous     Best Motor Response 6-->(M6) obeys commands     Best Verbal Response 5-->(V5) oriented     [place] Coma Scale Score 15

## 2025-05-09 NOTE — ED PROVIDER NOTES
"ED Provider Note  [facility]      History     Chief Complaint   Patient presents with    Hematuria    Dysuria     The history is provided by medical records.     Glenys Levi is a 24 year old adult who presents to the ED with dysuria and hematuria. She noted dysuria starting around 3 am and hematuria around 6:30 am. She also reports urinary frequency and frequency. She is nauseous, but has not vomited. Has been drinking fluids and kept food down. She denies abdominal pain, back or flank pain, or prior abdominal surgery. She has not taken anything for her symptoms. She has never had a UTI before. LMP was 4/28. She denies vaginal bleeding or discharge. No chest pain, shortness of breath, cough, fever, or chills.      Past Medical History  Past Medical History:   Diagnosis Date    Asthma      Past Surgical History:   Procedure Laterality Date    [place] TENDON LENGTHENING  2009     amphetamine-dextroamphetamine (ADDERALL XR) 30 MG 24 hr capsule  cetirizine (ZYRTEC) 10 MG tablet  gabapentin (NEURONTIN) 300 MG capsule  hydrOXYzine HCl (ATARAX) 25 MG tablet  levalbuterol (XOPENEX HFA) 45 MCG/ACT inhaler  mirtazapine (REMERON) 15 MG tablet  nitroFURantoin macrocrystal-monohydrate (MACROBID) 100 MG capsule  ondansetron (ZOFRAN ODT) 4 MG ODT tab  triamcinolone (KENALOG) 0.1 % external cream  triamcinolone (KENALOG) 0.1 % external cream      Allergies   Allergen Reactions    Amoxicillin Hives and Nausea and Vomiting     Family History  No family history on file.  Social History   Social History     Tobacco Use    Smoking status: Never    Smokeless tobacco: Never   Substance Use Topics    Alcohol use: No    Drug use: No      A medically appropriate review of systems was performed with pertinent positives and negatives noted in the HPI, and all other systems negative.    Physical Exam   BP: 127/85  Pulse: 87  Temp: 97.9  F (36.6  C)  Resp: 18  Height: 162.6 cm (5' 4\")  Weight: 65.3 kg (144 " lb)  SpO2: 100 %  Physical Exam  Vitals and nursing note reviewed.   Constitutional:       General: Glenys is not in acute distress.     Appearance: Normal appearance. Glenys is not diaphoretic.   HENT:      Head: Atraumatic.      Mouth/Throat:      Mouth: Mucous membranes are moist.      Comments: Hoarse and diminished voice.  Eyes:      General: No scleral icterus.     Conjunctiva/sclera: Conjunctivae normal.   Cardiovascular:      Rate and Rhythm: Normal rate.      Heart sounds: Normal heart sounds.   Pulmonary:      Effort: No respiratory distress.      Breath sounds: Normal breath sounds.   Abdominal:      General: Abdomen is flat.      Tenderness: There is no abdominal tenderness. There is no guarding or rebound.   Musculoskeletal:         General: No swelling. Normal range of motion.      Cervical back: Neck supple.      Right lower leg: No edema.   Skin:     General: Skin is warm.      Findings: No rash.   Neurological:      General: No focal deficit present.      Mental Status: Glenys is alert and oriented to person, place, and time.      Sensory: No sensory deficit.      Motor: No weakness.   Psychiatric:         Mood and Affect: Mood normal.         ED Course, Procedures, & Data      Procedures          Results for orders placed or performed during the hospital encounter of 05/09/25   UA with Microscopic reflex to Culture     Status: Abnormal    Specimen: Urine, Clean Catch   Result Value Ref Range    Color Urine Dark Yellow (A) Colorless, Straw, Light Yellow, Yellow    Appearance Urine Slightly Cloudy (A) Clear    Glucose Urine Negative Negative mg/dL    Bilirubin Urine Small (A) Negative    Ketones Urine Negative Negative mg/dL    Specific Gravity Urine 1.016 1.003 - 1.035    Blood Urine Large (A) Negative    pH Urine 7.0 5.0 - 7.0    Protein Albumin Urine 20 (A) Negative mg/dL    Urobilinogen Urine 3.0 (A) Normal mg/dL    Nitrite Urine Positive (A) Negative    Leukocyte Esterase Urine Moderate (A)  Negative    WBC Clumps Urine Present (A) None Seen /HPF    Mucus Urine Present (A) None Seen /LPF    RBC Urine 176 (H) <=2 /HPF    WBC Urine 101 (H) <=5 /HPF    Squamous Epithelials Urine 1 <=1 /HPF    Transitional Epithelials Urine 1 <=1 /HPF    Narrative    Urine Culture ordered based on laboratory criteria   HCG qualitative urine     Status: Normal   Result Value Ref Range    hCG Urine Qualitative Negative Negative     Medications   acetaminophen (TYLENOL) tablet 1,000 mg (has no administration in time range)   ondansetron (ZOFRAN ODT) ODT tab 4 mg (has no administration in time range)     Labs Ordered and Resulted from Time of ED Arrival to Time of ED Departure   ROUTINE UA WITH MICROSCOPIC REFLEX TO CULTURE - Abnormal       Result Value    Color Urine Dark Yellow (*)     Appearance Urine Slightly Cloudy (*)     Glucose Urine Negative      Bilirubin Urine Small (*)     Ketones Urine Negative      Specific Gravity Urine 1.016      Blood Urine Large (*)     pH Urine 7.0      Protein Albumin Urine 20 (*)     Urobilinogen Urine 3.0 (*)     Nitrite Urine Positive (*)     Leukocyte Esterase Urine Moderate (*)     WBC Clumps Urine Present (*)     Mucus Urine Present (*)     RBC Urine 176 (*)     WBC Urine 101 (*)     Squamous Epithelials Urine 1      Transitional Epithelials Urine 1     HCG QUALITATIVE URINE - Normal    hCG Urine Qualitative Negative     URINE CULTURE     No orders to display          Critical care was not performed.     Medical Decision Making  The patient's presentation was of moderate complexity (an undiagnosed new problem with uncertain prognosis).    The patient's evaluation involved:  ordering and/or review of 3+ test(s) in this encounter (see separate area of note for details)    The patient's management necessitated moderate risk (prescription drug management including medications given in the ED).    Assessment & Plan    This is a 24-year-old female who presents to the ED today for burning  with urination as well as blood in her urine since 3 AM.  She states she has never had a bladder or kidney function in the past..  She has frequency burning and hematuria currently.  She states her last menstrual period was 2 weeks ago and was normal for her.  She is currently denying any vaginal bleeding or discharge and is refusing a pelvic exam.  Labs ordered.    Labs are consistent with cystitis with hematuria.  She has no flank pain so I do not think this is pyelonephritis.  She will be discharged on nitrofurantoin and will follow-up with her primary care provider in the next 5 to 7 days.    I have reviewed the nursing notes. I have reviewed the findings, diagnosis, plan and need for follow up with the patient.    New Prescriptions    NITROFURANTOIN MACROCRYSTAL-MONOHYDRATE (MACROBID) 100 MG CAPSULE    Take 1 capsule (100 mg) by mouth 2 times daily.    ONDANSETRON (ZOFRAN ODT) 4 MG ODT TAB    Take 1 tablet (4 mg) by mouth every 8 hours as needed.       Final diagnoses:   Acute cystitis with hematuria   IAstrid, am serving as a trained medical scribe to document services personally performed by Basil Mirza MD, based on the provider's statements to me.     IBasil MD, was physically present and have reviewed and verified the accuracy of this note documented by Astrid Louis.      Basil Mirza MD  [facility] EMERGENCY DEPARTMENT  5/9/2025     Basil Mirza MD  05/09/25 1118

## 2025-05-09 NOTE — DISCHARGE INSTRUCTIONS
Take the antibiotics as prescribed. Use acetaminophen or ibuprofen as needed for pain or fever. Follow up with your primary care provider in the next week.

## 2025-05-10 LAB — BACTERIA UR CULT: ABNORMAL

## 2025-05-11 ENCOUNTER — TELEPHONE (OUTPATIENT)
Dept: NURSING | Facility: CLINIC | Age: 25
End: 2025-05-11
Payer: COMMERCIAL

## 2025-05-11 NOTE — TELEPHONE ENCOUNTER
"[facility] ([place])    Reason for call: Lab Result Notification     Lab Result (including Rx patient on, if applicable).  If culture, copy of lab report at bottom.  Lab Result: Urine Culture - see below    ED Rx: nitroFURantoin macrocrystal-monohydrate (MACROBID) 100 MG capsule - Take 1 capsule (100 mg) by mouth 2 times daily x 7 days  >100,000 CFU/mL Escherichia coli Abnormal  (Susceptible)    ondansetron (ZOFRAN ODT) 4 MG ODT tab -  Take 1 tablet (4 mg) by mouth every 8 hours as needed (nausea)    Creatinine Level (mg/dl) No results found for: \"CR\" Creatinine clearance (ml/min), if applicable    Creatinine clearance cannot be calculated (No successful lab value found.)     ED Symptoms: Patient presented to [facility] ED on 5/9/2025 with dysuria and hematuria.     RN Recommendations/Instructions per [facility] ED lab result protocol:   [facility] ED lab result protocol utilized: Urine Culture  Continue antibiotic/medication as prescribed: Nitrofurantoin    Patient's current Symptoms:   Feeling better overall. Tolerating antibiotic well.      Patient/care giver notified to contact your PCP clinic or return to the Emergency department if your:  Symptoms return.  Symptoms do not resolve after completing antibiotic.  Symptoms worsen or other concerning symptoms.         Ninoska Torres RN  "

## 2025-05-17 ENCOUNTER — VIRTUAL VISIT (OUTPATIENT)
Dept: URGENT CARE | Facility: CLINIC | Age: 25
End: 2025-05-17
Payer: COMMERCIAL

## 2025-05-17 DIAGNOSIS — N39.0 RECURRENT UTI (URINARY TRACT INFECTION): Primary | ICD-10-CM

## 2025-05-17 PROCEDURE — 98005 SYNCH AUDIO-VIDEO EST LOW 20: CPT

## 2025-05-17 RX ORDER — SULFAMETHOXAZOLE AND TRIMETHOPRIM 800; 160 MG/1; MG/1
1 TABLET ORAL 2 TIMES DAILY
Qty: 6 TABLET | Refills: 0 | Status: SHIPPED | OUTPATIENT
Start: 2025-05-17 | End: 2025-05-20

## 2025-05-17 NOTE — PROGRESS NOTES
Glenys is a 24 year old adult who presents for a billable video visit.    ASSESSMENT/PLAN:  Diagnoses and all orders for this visit:    Recurrent UTI (urinary tract infection)  -     sulfamethoxazole-trimethoprim (BACTRIM DS) 800-160 MG tablet; Take 1 tablet by mouth 2 times daily for 3 days.      A urine culture obtained one week ago showed the presence of E. coli, which was susceptible to Macrobid, Bactrim, and cephalosporins. The patient will be treated with Bactrim twice daily for 3 days. She was advised to follow up in the clinic if symptoms do not improve or completely resolve after completing the course    Follow up with primary care provider with any problems, questions or concerns or if symptoms worsen or fail to improve. Patient agreed to plan and verbalized understanding.     SUBJECTIVE:    The patient presents with recurrent urinary symptoms. She was evaluated in the emergency room on 05/09/2025 and diagnosed with acute cystitis. She was prescribed Macrobid twice daily for 5 days, and reports that her symptoms initially improved during treatment. However, her symptoms recurred yesterday. She is currently experiencing dysuria, urinary frequency, and urgency. She denies fever, chills, or other systemic symptoms    ROS: Pertinent ROS neg other than the symptoms noted above in the HPI.     OBJECTIVE:  Vitals not done due to this being a virtual visit    GENERAL: healthy, alert and no distress  EYES: Eyes grossly normal to inspection,conjunctivae and sclerae normal  RESP: Able to speak in complete sentences, no audible wheeze or cough  SKIN: no suspicious lesions or rashes  NEURO: mentation intact and speech normal  PSYCH: mentation appears normal, affect normal/bright    Video-Visit Details    Type of service:  Video Visit  Video Start Time: 5:32 pm  Video End Time: 5:36 pm    Originating Location: Home    Distant Location:  [facility] VIRTUAL URGENT CARE     Platform used for Video Visit:  Transitional Planning:  States plan is home tomorrow. HHA agency is [facility]. Referral sent. [facility] had patient in the past for skilled care, not HHA services.   Lennox Lima PA-C   [de-identified] : The natural progression of Osteoarthritis was explained to the patient.  We discussed the possible treatment options from conservative to operative.  These included NSAIDS, Glucosamine and Chondrotin sulfate, and Physical Therapy as well different types of injections.  We also discussed that at some point they may progress to needed a TKA.  Information and pamphlets were given when appropriate.  The risks, benefits, contents and alternatives to injection were explained in full to the patient.  Risks outlined include but are not limited to infection, sepsis, bleeding, scarring, skin discoloration, temporary increase in pain, syncopal episode, failure to resolve symptoms, allergic reaction, flare reaction, permanent white skin discoloration, symptom recurrence, and elevation of blood sugar in diabetics.  Patient understood the risks.  All questions were answered.  After discussion of options, patient requested an injection.  Oral informed consent was obtained and sterile prep was done of the injection site.  Sterile technique was used to introduce the mixture.  Patient tolerated the procedure well.  Patient advised to ice the injection site this evening.  Signs and symptoms of infection reviewed and patient advised to call immediately for redness, fevers, and/or chills.  Progress note completed by Moraima Abel PA-C.

## 2025-07-22 ENCOUNTER — TELEPHONE (OUTPATIENT)
Dept: FAMILY MEDICINE | Facility: CLINIC | Age: 25
End: 2025-07-22
Payer: COMMERCIAL

## 2025-07-22 ENCOUNTER — HOSPITAL ENCOUNTER (EMERGENCY)
Facility: CLINIC | Age: 25
Discharge: HOME | End: 2025-07-22
Attending: EMERGENCY MEDICINE | Admitting: EMERGENCY MEDICINE
Payer: COMMERCIAL

## 2025-07-22 VITALS
OXYGEN SATURATION: 100 % | DIASTOLIC BLOOD PRESSURE: 87 MMHG | RESPIRATION RATE: 18 BRPM | SYSTOLIC BLOOD PRESSURE: 142 MMHG | HEART RATE: 73 BPM | TEMPERATURE: 97.7 F
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
## 2025-07-22 DIAGNOSIS — F07.81 POST CONCUSSIVE SYNDROME: ICD-10-CM
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
## 2025-07-22 PROCEDURE — 99283 EMERGENCY DEPT VISIT LOW MDM: CPT | Performed by: EMERGENCY MEDICINE

## 2025-07-22 RX ORDER — IBUPROFEN 600 MG/1
600 TABLET, FILM COATED ORAL ONCE
Status: DISCONTINUED | OUTPATIENT
Start: 2025-07-22 | End: 2025-07-22 | Stop reason: HOSPADM

## 2025-07-22 ASSESSMENT — COLUMBIA-SUICIDE SEVERITY RATING SCALE - C-SSRS
6. HAVE YOU EVER DONE ANYTHING, STARTED TO DO ANYTHING, OR PREPARED TO DO ANYTHING TO END YOUR LIFE?: NO
1. IN THE PAST MONTH, HAVE YOU WISHED YOU WERE DEAD OR WISHED YOU COULD GO TO SLEEP AND NOT WAKE UP?: NO
2. HAVE YOU ACTUALLY HAD ANY THOUGHTS OF KILLING YOURSELF IN THE PAST MONTH?: NO

## 2025-07-22 NOTE — TELEPHONE ENCOUNTER
Red line transfer from central scheduler. Advised that writer could certainly take the call for any red flag sx and clarified primary care clinic to open TE, but  thought it was after 1800 and they were transferring to [facility]/did not realize they would get any next available RN.  transferring to primary clinic for triage.    ONUR Munoz, BSN, PHN, AMB-BC (she/her)  [facility] RN

## 2025-07-22 NOTE — ED PROVIDER NOTES
"    [place] EMERGENCY DEPARTMENT ([facility])    7/22/25       ED PROVIDER NOTE       History     Chief Complaint   Patient presents with    Head Injury     HPI  Glenysdioni Levi is a 25 year old adult with a history of PTSD, cerebral palsy, and ADHD who presents to the Emergency Department via EMS for evaluation after a mechanical fall in the shower.     Patient reports slipping and falling forward in the shower around 4:30 AM this morning with head strike. Patient reports she hit her left head on the sink.  Patient she did not have any loss of consciousness, she is not anticoagulated.  Her roommate was home and was able to help her get out of the tub.  She reports a mild headache frontal and bilateral temples.  She also notes a dull ache to the posterior head with a \"funny feeling on the top of her head.    Denies any vision changes, dizziness, lightheadedness, shortness of breath, chest pain, weakness, nausea, vomiting, numbness, tingling, weakness, neck or back pain.  Denies any patient was in her normal state of health when she slipped.  She was at work and had an episode of nausea and a coworker told her to be evaluated.  She feels better after eating a protein bar and 2 donuts.  No other concerns beside headache      Past Medical History  Past Medical History:   Diagnosis Date    Asthma      Past Surgical History:   Procedure Laterality Date    [place] TENDON LENGTHENING  2009     amphetamine-dextroamphetamine (ADDERALL XR) 30 MG 24 hr capsule  cetirizine (ZYRTEC) 10 MG tablet  gabapentin (NEURONTIN) 300 MG capsule  hydrOXYzine HCl (ATARAX) 25 MG tablet  levalbuterol (XOPENEX HFA) 45 MCG/ACT inhaler  mirtazapine (REMERON) 15 MG tablet  nitroFURantoin macrocrystal-monohydrate (MACROBID) 100 MG capsule  triamcinolone (KENALOG) 0.1 % external cream  triamcinolone (KENALOG) 0.1 % external cream      Allergies   Allergen Reactions    Amoxicillin Hives and Nausea and Vomiting     Family History  No " family history on file.  Social History   Social History     Tobacco Use    Smoking status: Never    Smokeless tobacco: Never   Substance Use Topics    Alcohol use: No    Drug use: No      A medically appropriate review of systems was performed with pertinent positives and negatives noted in the HPI, and all other systems negative.    Physical Exam   BP: (!) 142/87  Pulse: 73  Temp: 97.7  F (36.5  C)  Resp: 18  SpO2: 100 %  Physical Exam  General:  No acute distress.  HENT:  Normocephalic and atraumatic.  No bogginess, bony step-off, open skin, ecchymosis.  No evidence of basilar skull fracture.  Minimal tenderness to the left temple.  No mastoid or facial bone tenderness.  Normal jaw opening closed.  Normal speech and swallow.  Eyes: EOMI. Conjunctivae normal.   Cardiovascular: Normal rate and regular rhythm.  Normal heart sounds. No murmur heard.  Pulmonary:  No respiratory distress. Normal breath sounds.   Abdominal: There is no distension.  Abdomen is soft. There is no mass.  There is no abdominal tenderness.   Musculoskeletal: No swelling or tenderness.  Moving all extremities spontaneously.  No midline spinal tenderness throughout.  Skin: Warm and dry  Neurological: No focal deficit present.  A and O x 3.  Following commands.  Mood and Affect: Mood normal.      ED Course, Procedures, & Data      Procedures                   Medications - No data to display       Critical care was not performed.     Medical Decision Making  The patient's presentation was of low complexity (an acute and uncomplicated illness or injury).    The patient's evaluation involved:  history and exam without other MDM data elements      The patient's management necessitated only low risk treatment.    Assessment & Plan    Patient presents emergency department for slipping in the shower at 4:30 AM, hitting her left temple on the sink.  No concerning features on history or exam.  No indication for advanced imaging or blood work.  Patient  is in her normal state of health and denies any other medical complaints.  Patient notes feeling nauseous earlier which prompted evaluation.    She declines any Tylenol or ibuprofen.  Patient was feeling nauseous earlier at work however felt better with food.  Discussed obtaining blood work if she is having persistent nausea, however she states that this is all resolved.  I discussed that it could also be a symptom of a concussion.  Discussed concussion symptoms and precautions with her.  And referred to concussion clinic if she has persistent symptoms.  Discharged with return precautions    I have reviewed the nursing notes. I have reviewed the findings, diagnosis, plan and need for follow up with the patient.    New Prescriptions    No medications on file       Final diagnoses:   None   Shelly STEVENS, am serving as a trained medical scribe to document services personally performed by Kaveh Allen DO based on the provider's statements to me on July 22, 2025.  This document has been checked and approved by the attending provider.    Tiffany STEVENS Lee, DO, was physically present and have reviewed and verified the accuracy of this note documented by Shelly Del Cid medical scribe.      Kaveh Allen DO   [facility] EMERGENCY DEPARTMENT  7/22/2025     Tiffany Linn DO  07/23/25 0831

## 2025-07-22 NOTE — ED TRIAGE NOTES
Ambulatory to triage c/o pain in the head. States she slipped in the shower and hit head on the sink. No LOC. Not on blood thinners.     BP (!) 142/87   Pulse 73   Temp 97.7  F (36.5  C) (Temporal)   Resp 18   SpO2 100%      Triage Assessment (Adult)       Row Name 07/22/25 1058          Triage Assessment    Airway WDL WDL        Respiratory WDL    Respiratory WDL WDL        Cognitive/Neuro/Behavioral WDL    Cognitive/Neuro/Behavioral WDL WDL

## 2025-07-22 NOTE — ED TRIAGE NOTES
BIBA from home for head injury  Hit L temple after falling in shower at 0430  No LOC  Wants to r/o head injury  No prior hx of head injury, no thinners

## 2025-07-22 NOTE — DISCHARGE INSTRUCTIONS
You can take Tylenol and ibuprofen as directed as needed for pain.  Ibuprofen should be taken with food.      I referred you to concussion clinic for follow-up.  A  will call you to schedule this.    Concussion can present in multiple ways.  It can present with memory issues, headache, nausea, fatigue, irritability.  As discussed, stay hydrated, get plenty of rest.  Avoid screen time.  Try to go to bed at the same time every night and wake up at the same time every day.  These are some things you can do to reduce your concussion symptoms.